# Patient Record
Sex: MALE | Race: WHITE | NOT HISPANIC OR LATINO | Employment: UNEMPLOYED | URBAN - METROPOLITAN AREA
[De-identification: names, ages, dates, MRNs, and addresses within clinical notes are randomized per-mention and may not be internally consistent; named-entity substitution may affect disease eponyms.]

---

## 2023-11-06 PROBLEM — H01.00B BLEPHARITIS OF UPPER AND LOWER EYELIDS OF BOTH EYES: Status: ACTIVE | Noted: 2023-04-03

## 2023-11-06 PROBLEM — H54.7 DECREASED VISION: Status: ACTIVE | Noted: 2021-01-14

## 2023-11-06 PROBLEM — I25.118 CORONARY ARTERY DISEASE OF NATIVE ARTERY WITH STABLE ANGINA PECTORIS (CMS-HCC): Status: ACTIVE | Noted: 2018-01-01

## 2023-11-06 PROBLEM — I20.89 STABLE ANGINA PECTORIS (CMS-HCC): Chronic | Status: ACTIVE | Noted: 2021-03-28

## 2023-11-06 PROBLEM — Z98.61 CAD S/P PERCUTANEOUS CORONARY ANGIOPLASTY: Status: ACTIVE | Noted: 2023-11-06

## 2023-11-06 PROBLEM — H01.00A BLEPHARITIS OF UPPER AND LOWER EYELIDS OF BOTH EYES: Status: ACTIVE | Noted: 2023-04-03

## 2023-11-06 PROBLEM — H16.223 KERATOCONJUNCTIVITIS SICCA OF BOTH EYES NOT SPECIFIED AS SJOGREN'S: Status: ACTIVE | Noted: 2023-04-03

## 2023-11-06 PROBLEM — I10 ESSENTIAL HYPERTENSION: Status: ACTIVE | Noted: 2021-01-14

## 2023-11-06 PROBLEM — Z95.5 STATUS POST CORONARY ARTERY BALLOON DILATION: Chronic | Status: ACTIVE | Noted: 2021-01-14

## 2023-11-06 PROBLEM — E78.5 HYPERLIPIDEMIA: Status: ACTIVE | Noted: 2023-11-06

## 2023-11-06 PROBLEM — H02.88A MEIBOMIAN GLAND DYSFUNCTION (MGD) OF UPPER AND LOWER LIDS OF BOTH EYES: Status: ACTIVE | Noted: 2021-03-04

## 2023-11-06 PROBLEM — I25.10 CAD S/P PERCUTANEOUS CORONARY ANGIOPLASTY: Status: ACTIVE | Noted: 2023-11-06

## 2023-11-06 PROBLEM — I20.0 WORSENING ANGINA (MULTI): Status: ACTIVE | Noted: 2023-11-06

## 2023-11-06 PROBLEM — E66.01 SEVERE OBESITY (BMI >= 40) (MULTI): Chronic | Status: ACTIVE | Noted: 2021-01-14

## 2023-11-06 PROBLEM — H02.88B MEIBOMIAN GLAND DYSFUNCTION (MGD) OF UPPER AND LOWER LIDS OF BOTH EYES: Status: ACTIVE | Noted: 2021-03-04

## 2023-11-06 PROBLEM — E78.00 PURE HYPERCHOLESTEROLEMIA: Status: ACTIVE | Noted: 2023-11-06

## 2023-11-06 PROBLEM — R07.89 NON-CARDIAC CHEST PAIN: Status: ACTIVE | Noted: 2023-11-06

## 2023-11-06 RX ORDER — VIT A/VIT C/VIT E/ZINC/COPPER 2148-113
1 TABLET ORAL
COMMUNITY
Start: 2023-08-05 | End: 2023-11-30 | Stop reason: ALTCHOICE

## 2023-11-06 RX ORDER — ROSUVASTATIN CALCIUM 40 MG/1
40 TABLET, COATED ORAL DAILY
COMMUNITY
Start: 2022-03-12 | End: 2023-11-30 | Stop reason: SDUPTHER

## 2023-11-06 RX ORDER — MULTIVITAMIN
1 TABLET ORAL
COMMUNITY
Start: 2022-03-31 | End: 2023-11-30 | Stop reason: ALTCHOICE

## 2023-11-06 RX ORDER — CLOPIDOGREL BISULFATE 75 MG/1
75 TABLET ORAL DAILY
COMMUNITY
Start: 2021-03-18 | End: 2023-11-30 | Stop reason: SDUPTHER

## 2023-11-06 RX ORDER — METFORMIN HYDROCHLORIDE 500 MG/1
500 TABLET ORAL
COMMUNITY
Start: 2022-03-31 | End: 2023-11-30 | Stop reason: ALTCHOICE

## 2023-11-06 RX ORDER — NITROGLYCERIN 0.3 MG/1
0.3 TABLET SUBLINGUAL EVERY 5 MIN PRN
COMMUNITY
Start: 2022-09-26 | End: 2023-11-30 | Stop reason: SDUPTHER

## 2023-11-06 RX ORDER — ASPIRIN 325 MG
50000 TABLET, DELAYED RELEASE (ENTERIC COATED) ORAL
COMMUNITY
Start: 2022-02-09

## 2023-11-06 RX ORDER — IVERMECTIN 3 MG/1
3 TABLET ORAL ONCE
COMMUNITY
Start: 2021-02-16 | End: 2024-05-30 | Stop reason: ALTCHOICE

## 2023-11-06 RX ORDER — OMEGA-3 FATTY ACIDS 1000 MG
1000 CAPSULE ORAL 3 TIMES DAILY
COMMUNITY
Start: 2022-03-31 | End: 2023-11-30 | Stop reason: ALTCHOICE

## 2023-11-06 RX ORDER — LOSARTAN POTASSIUM AND HYDROCHLOROTHIAZIDE 12.5; 5 MG/1; MG/1
1 TABLET ORAL
COMMUNITY
Start: 2021-03-19 | End: 2023-11-30 | Stop reason: SDUPTHER

## 2023-11-06 RX ORDER — ISOSORBIDE MONONITRATE 30 MG/1
30 TABLET, EXTENDED RELEASE ORAL DAILY
COMMUNITY
Start: 2021-04-19 | End: 2023-11-30 | Stop reason: SDUPTHER

## 2023-11-06 RX ORDER — BISOPROLOL FUMARATE 5 MG/1
5 TABLET, FILM COATED ORAL
COMMUNITY
Start: 2021-01-15 | End: 2023-11-30 | Stop reason: SDUPTHER

## 2023-11-06 RX ORDER — ATORVASTATIN CALCIUM 20 MG/1
20 TABLET, FILM COATED ORAL
COMMUNITY
Start: 2020-11-27 | End: 2023-11-30 | Stop reason: WASHOUT

## 2023-11-16 ENCOUNTER — APPOINTMENT (OUTPATIENT)
Dept: CARDIOLOGY | Facility: CLINIC | Age: 58
End: 2023-11-16
Payer: COMMERCIAL

## 2023-11-30 ENCOUNTER — OFFICE VISIT (OUTPATIENT)
Dept: CARDIOLOGY | Facility: CLINIC | Age: 58
End: 2023-11-30
Payer: COMMERCIAL

## 2023-11-30 VITALS
WEIGHT: 305.8 LBS | HEIGHT: 72 IN | BODY MASS INDEX: 41.42 KG/M2 | OXYGEN SATURATION: 99 % | HEART RATE: 72 BPM | SYSTOLIC BLOOD PRESSURE: 123 MMHG | DIASTOLIC BLOOD PRESSURE: 80 MMHG

## 2023-11-30 DIAGNOSIS — Z98.61 CAD S/P PERCUTANEOUS CORONARY ANGIOPLASTY: Primary | ICD-10-CM

## 2023-11-30 DIAGNOSIS — I25.2 HISTORY OF MI (MYOCARDIAL INFARCTION): ICD-10-CM

## 2023-11-30 DIAGNOSIS — E78.2 MIXED HYPERLIPIDEMIA: ICD-10-CM

## 2023-11-30 DIAGNOSIS — I20.89 STABLE ANGINA PECTORIS (CMS-HCC): Chronic | ICD-10-CM

## 2023-11-30 DIAGNOSIS — R53.83 FATIGUE, UNSPECIFIED TYPE: ICD-10-CM

## 2023-11-30 DIAGNOSIS — R94.31 ABNORMAL EKG: ICD-10-CM

## 2023-11-30 DIAGNOSIS — I25.118 CORONARY ARTERY DISEASE OF NATIVE ARTERY OF NATIVE HEART WITH STABLE ANGINA PECTORIS (CMS-HCC): ICD-10-CM

## 2023-11-30 DIAGNOSIS — I25.10 CAD S/P PERCUTANEOUS CORONARY ANGIOPLASTY: Primary | ICD-10-CM

## 2023-11-30 DIAGNOSIS — Z95.5 STATUS POST CORONARY ARTERY BALLOON DILATION: Chronic | ICD-10-CM

## 2023-11-30 DIAGNOSIS — E78.00 PURE HYPERCHOLESTEROLEMIA: ICD-10-CM

## 2023-11-30 DIAGNOSIS — I10 ESSENTIAL HYPERTENSION: ICD-10-CM

## 2023-11-30 DIAGNOSIS — R06.09 DOE (DYSPNEA ON EXERTION): ICD-10-CM

## 2023-11-30 DIAGNOSIS — K21.9 GASTROESOPHAGEAL REFLUX DISEASE WITHOUT ESOPHAGITIS: ICD-10-CM

## 2023-11-30 DIAGNOSIS — R07.89 NON-CARDIAC CHEST PAIN: ICD-10-CM

## 2023-11-30 DIAGNOSIS — E66.01 SEVERE OBESITY (BMI >= 40) (MULTI): Chronic | ICD-10-CM

## 2023-11-30 PROCEDURE — 1036F TOBACCO NON-USER: CPT | Performed by: INTERNAL MEDICINE

## 2023-11-30 PROCEDURE — 93000 ELECTROCARDIOGRAM COMPLETE: CPT | Performed by: INTERNAL MEDICINE

## 2023-11-30 PROCEDURE — 99215 OFFICE O/P EST HI 40 MIN: CPT | Performed by: INTERNAL MEDICINE

## 2023-11-30 PROCEDURE — 3074F SYST BP LT 130 MM HG: CPT | Performed by: INTERNAL MEDICINE

## 2023-11-30 PROCEDURE — 3079F DIAST BP 80-89 MM HG: CPT | Performed by: INTERNAL MEDICINE

## 2023-11-30 RX ORDER — NITROGLYCERIN 0.3 MG/1
0.3 TABLET SUBLINGUAL EVERY 5 MIN PRN
Qty: 25 TABLET | Refills: 11 | Status: SHIPPED | OUTPATIENT
Start: 2023-11-30 | End: 2023-12-12 | Stop reason: ALTCHOICE

## 2023-11-30 RX ORDER — CYCLOSPORINE 0.5 MG/ML
EMULSION OPHTHALMIC
COMMUNITY
Start: 2022-12-13

## 2023-11-30 RX ORDER — CLOPIDOGREL BISULFATE 75 MG/1
75 TABLET ORAL DAILY
Qty: 90 TABLET | Refills: 3 | Status: SHIPPED | OUTPATIENT
Start: 2023-11-30 | End: 2024-11-29

## 2023-11-30 RX ORDER — ASPIRIN 81 MG/1
81 TABLET ORAL
Qty: 90 TABLET | Refills: 3 | Status: SHIPPED | OUTPATIENT
Start: 2023-11-30 | End: 2024-11-29

## 2023-11-30 RX ORDER — ASPIRIN 81 MG/1
81 TABLET ORAL
COMMUNITY
Start: 2021-01-15 | End: 2023-11-30 | Stop reason: SDUPTHER

## 2023-11-30 RX ORDER — BISOPROLOL FUMARATE 5 MG/1
5 TABLET, FILM COATED ORAL
Qty: 90 TABLET | Refills: 3 | Status: SHIPPED | OUTPATIENT
Start: 2023-11-30 | End: 2024-11-29

## 2023-11-30 RX ORDER — CLINDAMYCIN HYDROCHLORIDE 300 MG/1
CAPSULE ORAL
COMMUNITY
Start: 2022-12-17 | End: 2023-11-30 | Stop reason: ALTCHOICE

## 2023-11-30 RX ORDER — FLASH GLUCOSE SENSOR
KIT MISCELLANEOUS
COMMUNITY
Start: 2023-11-16

## 2023-11-30 RX ORDER — ROSUVASTATIN CALCIUM 40 MG/1
40 TABLET, COATED ORAL DAILY
Qty: 30 TABLET | Refills: 3 | Status: SHIPPED | OUTPATIENT
Start: 2023-11-30 | End: 2024-11-29

## 2023-11-30 RX ORDER — LOSARTAN POTASSIUM AND HYDROCHLOROTHIAZIDE 12.5; 5 MG/1; MG/1
1 TABLET ORAL
Qty: 90 TABLET | Refills: 3 | Status: SHIPPED | OUTPATIENT
Start: 2023-11-30 | End: 2024-11-29

## 2023-11-30 RX ORDER — ISOSORBIDE MONONITRATE 60 MG/1
60 TABLET, EXTENDED RELEASE ORAL DAILY
Qty: 90 TABLET | Refills: 3 | Status: SHIPPED | OUTPATIENT
Start: 2023-11-30 | End: 2024-11-29

## 2023-11-30 RX ORDER — RANOLAZINE 500 MG/1
500 TABLET, EXTENDED RELEASE ORAL 2 TIMES DAILY
Qty: 180 TABLET | Refills: 3 | Status: SHIPPED | OUTPATIENT
Start: 2023-11-30 | End: 2024-11-29

## 2023-11-30 ASSESSMENT — PAIN SCALES - GENERAL: PAINLEVEL: 0-NO PAIN

## 2023-11-30 NOTE — PROGRESS NOTES
CARDIOLOGY OFFICE NOTE       Patient:    Clif Lorenzo    YOB: 1965  MRN:    [id number]    Date:   11/30/2023    Primary Physician: Dr YESENIA Donnelly     Reason for Visit: 1 year cardiology follow-up, prior patient of Dr. Crawford     .  IMPRESSION:      Chest pain  Shortness of breath with exertion  Fatigue  History of snoring  Coronary artery disease post prior inferior MI 7 cc area post LAD stent angioplasty 2008 ([place]), known subtotal right coronary artery, medical treatment to date otherwise.  Abnormal perfusion study 2022  Preserved left ventricular function, 55%.  Abnormal resting electrocardiogram  Hypertension  Hyperlipidemia  Diabetes  GERD  Past tobacco abuse  Obesity  Allergy to penicillin.  Otherwise as per assessment below.    RECOMMENDATIONS:      Patient has the above history and symptomatology.  He appears to be doing well overall with controlled symptoms given his known coronary artery disease.  Would suggest continuing his current medications with an increase of his Imdur to 60 mg daily and adding Ranexa 500 mg twice daily for better angina control.  Refills were otherwise provided.    Have encouraged him to follow an exercise dietary weight reduction program.  Hydration was encouraged.    He may need evaluation for sleep apnea in the future.    [employer] portal use was encouraged.    We will plan to see back in 6 months with Laboratory Studies and ECG as noted below.     Patient will follow up with their primary physician for general care.    The patient knows to contact medical care earlier if need be.      HPI:     Clif Lorenzo was seen in cardiac evaluation at the  Cardiology office November 30, 2023.      The patients problems are listed as in the impression above.    Electronic medical records reviewed.    Patient presents to establish ongoing cardiovascular care with myself.  This is our first visit.    Patient has a prior history of coronary artery disease with  inferior myocardial infarction in 2007 and this area ventral LAD stent angioplasty 2018 in [place].    Unsuccessful recanalization of the right coronary artery stenosis which was felt to be some totaled.  He had a positive perfusion stress test at the [facility] March 2021 with evidence of moderate inferior ischemia.  Normal myocardial function.  Ejection fraction was 55%.  Medical management was recommended by Dr. Coreas on his last visit.  No further testing was suggested.    Patient returns now for follow-up and states that overall he is about the same.  He works for [employer] as a .  He does get chest pain but is not limiting.  He does have dyspnea on exertion.  His wife states that he does snore at night.  He does admit to being tired.  He is carrying extra weight feels that this may be part of it.    He has no new complaint.    Patient denies Lightheadedness, Dizziness, TIA or CVA symptoms.  No CHF or Edema.  No Palpitations.  No GI,  or Bleeding Issues. No Recent Fever or Chills.     Cardiovascular and general review of systems is otherwise negative.    A 14-system review is otherwise negative, other than noted.    ALLERGIES:     Allergies   Allergen Reactions    Ampicillin-Sulbactam Anaphylaxis    Penicillins Unknown     Fainting    Aspirin Rash     When the dose is high        MEDICATIONS:     Current Outpatient Medications   Medication Instructions    aspirin 81 mg, oral, Daily RT    bisoprolol (ZEBETA) 5 mg, oral, Daily RT    cholecalciferol (VITAMIN D-3) 50,000 Units, oral, Weekly    clopidogrel (PLAVIX) 75 mg, oral, Daily    FreeStyle Christiano 2 Sensor kit USE TO CHECK BLOOD SUGAR DAILY.    isosorbide mononitrate ER (IMDUR) 60 mg, oral, Daily    ivermectin (STROMECTOL) 3 mg, oral, Once    losartan-hydrochlorothiazide (Hyzaar) 50-12.5 mg tablet 1 tablet, oral, Daily RT    nitroglycerin (NITROSTAT) 0.3 mg, sublingual, Every 5 min PRN    ranolazine (RANEXA) 500 mg, oral, 2 times daily, Do  not crush, chew, or split.    Restasis 0.05 % ophthalmic emulsion INSTILL ONE DROP IN BOTH EYES TWICE A DAY.    rosuvastatin (CRESTOR) 40 mg, oral, Daily       PAST MEDICAL HISTORY:   Hypertension.  Hyperlipidemia.  Diabetes.  No prior stroke, cerebrovascular disease or known peripheral vascular disease.  Coronary artery disease as noted above with prior inferior wall MI-2007, LAD stent angioplasty 2018, chronic right coronary artery stenosis medically treated today.  Abnormal perfusion stress test May 2021 with moderate ischemia.  Normal LV function.  Ejection fraction 55%.  Obesity.  No other significant past medical or surgical history.    SOCIAL HISTORY:   .  Works for [employer] as a .  Past smoker.  No alcohol, tobacco or illicit drug use currently.    FAMILY HISTORY:   Positive family history of coronary artery disease with father myocardial infarction.    ELECTROCARDIOGRAM:    Sinus rhythm, inferior wall microinfarction by Q's.  Poor R wave anterior progression cannot exclude anterior infarction.  Nonspecific ST wave changes.  Rate 68.    CARDIAC TESTING:    None recently done nor available for review.    LABORATORY DATA:    None recently done nor available for review.    VITALS:     Vitals:    11/30/23 1418   BP: 123/80   Pulse: 72   SpO2: 99%       Wt Readings from Last 4 Encounters:   11/30/23 139 kg (305 lb 12.8 oz)   11/15/22 (!) 140 kg (309 lb 0.2 oz)   11/09/21 (!) 139 kg (306 lb 0.2 oz)   04/19/21 134 kg (296 lb)       PHYSICAL EXAMINATION:      General: No acute distress. Vital signs as noted. Alert and oriented.  Head And Neck Examination: No jugular venous distention, no carotid bruits, no mass. Carotid upstrokes preserved. Oral mucosa moist.  No xanthelasma. Head and neck examination otherwise unremarkable.  Lungs: Clear to auscultation and percussion. No wheezes, no rales,  and no rhonchi.  Chest: Excursion appeared to be normal. No chest wall tenderness on palpation.  Heart:  Normal S1 and S2. No S3. No S4. No rub. Grade 1/6 systolic murmur, best heard at the left sternal border. Point of maximal impulse was within normal limits.  Abdomen: Soft. Nontender. No organomegaly. No bruits. No masses.  Morbidly obese.  Extremities: No bipedal edema. No clubbing. No cyanosis.  Pulses are strong throughout. No bruits.  Musculoskeletal Exam: No ulcers, otherwise unremarkable.  Neuro: Neurologically appeared grossly intact.    PROBLEM LIST:     Patient Active Problem List   Diagnosis    Blepharitis of upper and lower eyelids of both eyes    CAD S/P percutaneous coronary angioplasty    Coronary artery disease of native artery with stable angina pectoris (CMS/HCC)    Decreased vision    Essential hypertension    History of MI (myocardial infarction)    Hyperlipidemia    Keratoconjunctivitis sicca of both eyes not specified as Sjogren's    Meibomian gland dysfunction (MGD) of upper and lower lids of both eyes    Non-cardiac chest pain    Pure hypercholesterolemia    Severe obesity (BMI >= 40) (CMS/HCC)    Status post coronary artery balloon dilation    Stable angina pectoris    Worsening angina (CMS/[facility])             Kyrie Mcmanus MD, [facility] / [facility] /  Cardiology      Of Note:  [employer] voice recognition dictation software was utilized partially in the preparation of this note, therefore, inaccuracies in spelling, word choice and punctuation may have occurred which were not recognized the time of signing.    Patient was seen and examined with total time of visit including chart preparation, rooming, and chart completion exceeding 40 minutes.    ----

## 2023-11-30 NOTE — TELEPHONE ENCOUNTER
Pharmacist Gabbie Hewitt called office stating rx for Nitroglycerin 0.3 mg was escribed over. They do not have this medication dose in stock and state pt previously had rx for Nitroglycerin 0.4 mg tablets    Routed to Dr. Mcmanus for ok to send in Nitroglycerin 0.4 mg tablets.

## 2023-12-13 RX ORDER — NITROGLYCERIN 0.4 MG/1
0.4 TABLET SUBLINGUAL EVERY 5 MIN PRN
Qty: 100 TABLET | Refills: 11 | Status: SHIPPED | OUTPATIENT
Start: 2023-12-13 | End: 2024-12-12

## 2024-05-30 ENCOUNTER — OFFICE VISIT (OUTPATIENT)
Dept: CARDIOLOGY | Facility: CLINIC | Age: 59
End: 2024-05-30
Payer: COMMERCIAL

## 2024-05-30 VITALS
WEIGHT: 301.6 LBS | DIASTOLIC BLOOD PRESSURE: 73 MMHG | BODY MASS INDEX: 40.85 KG/M2 | SYSTOLIC BLOOD PRESSURE: 117 MMHG | HEART RATE: 61 BPM | HEIGHT: 72 IN

## 2024-05-30 DIAGNOSIS — Z95.5 STATUS POST CORONARY ARTERY BALLOON DILATION: Chronic | ICD-10-CM

## 2024-05-30 DIAGNOSIS — E66.01 SEVERE OBESITY (BMI >= 40) (MULTI): Chronic | ICD-10-CM

## 2024-05-30 DIAGNOSIS — I20.89 STABLE ANGINA PECTORIS (CMS-HCC): Chronic | ICD-10-CM

## 2024-05-30 DIAGNOSIS — Z98.61 CAD S/P PERCUTANEOUS CORONARY ANGIOPLASTY: ICD-10-CM

## 2024-05-30 DIAGNOSIS — I10 ESSENTIAL HYPERTENSION: ICD-10-CM

## 2024-05-30 DIAGNOSIS — I25.10 CAD S/P PERCUTANEOUS CORONARY ANGIOPLASTY: ICD-10-CM

## 2024-05-30 DIAGNOSIS — R94.31 ABNORMAL EKG: ICD-10-CM

## 2024-05-30 DIAGNOSIS — I25.118 CORONARY ARTERY DISEASE OF NATIVE ARTERY OF NATIVE HEART WITH STABLE ANGINA PECTORIS (CMS-HCC): ICD-10-CM

## 2024-05-30 DIAGNOSIS — K21.9 GASTROESOPHAGEAL REFLUX DISEASE WITHOUT ESOPHAGITIS: ICD-10-CM

## 2024-05-30 DIAGNOSIS — E78.2 MIXED HYPERLIPIDEMIA: Primary | ICD-10-CM

## 2024-05-30 DIAGNOSIS — R06.09 DOE (DYSPNEA ON EXERTION): ICD-10-CM

## 2024-05-30 DIAGNOSIS — R53.83 FATIGUE, UNSPECIFIED TYPE: ICD-10-CM

## 2024-05-30 DIAGNOSIS — I25.2 HISTORY OF MI (MYOCARDIAL INFARCTION): ICD-10-CM

## 2024-05-30 DIAGNOSIS — E78.00 PURE HYPERCHOLESTEROLEMIA: ICD-10-CM

## 2024-05-30 DIAGNOSIS — R07.89 NON-CARDIAC CHEST PAIN: ICD-10-CM

## 2024-05-30 DIAGNOSIS — E78.2 MIXED HYPERLIPIDEMIA: ICD-10-CM

## 2024-05-30 PROBLEM — Z86.39 HISTORY OF ELEVATED LIPIDS: Status: ACTIVE | Noted: 2024-05-30

## 2024-05-30 PROCEDURE — 3074F SYST BP LT 130 MM HG: CPT | Performed by: INTERNAL MEDICINE

## 2024-05-30 PROCEDURE — 3078F DIAST BP <80 MM HG: CPT | Performed by: INTERNAL MEDICINE

## 2024-05-30 PROCEDURE — 1036F TOBACCO NON-USER: CPT | Performed by: INTERNAL MEDICINE

## 2024-05-30 PROCEDURE — 99213 OFFICE O/P EST LOW 20 MIN: CPT | Performed by: INTERNAL MEDICINE

## 2024-05-30 RX ORDER — PREDNISOLONE ACETATE 10 MG/ML
1 SUSPENSION/ DROPS OPHTHALMIC 2 TIMES DAILY
COMMUNITY
Start: 2024-02-20

## 2024-05-30 RX ORDER — TOPIRAMATE 100 MG/1
100 TABLET, FILM COATED ORAL NIGHTLY
COMMUNITY
Start: 2024-05-21 | End: 2024-05-30 | Stop reason: ALTCHOICE

## 2024-05-30 RX ORDER — GLUCOSAM/CHONDRO/HERB 149/HYAL 750-100 MG
1000 TABLET ORAL 2 TIMES DAILY
COMMUNITY
Start: 2024-03-08

## 2024-05-30 ASSESSMENT — PAIN SCALES - GENERAL: PAINLEVEL: 0-NO PAIN

## 2024-05-30 NOTE — PROGRESS NOTES
CARDIOLOGY OFFICE NOTE     Date:   5/30/2024    Patient:    Clif Lorenzo    YOB: 1965    Primary Physician: Sole Donnelly DO       Reason for Visit: Follow-up cardiac visit    HPI:     Clif Lorenzo was seen in cardiac evaluation at the  Cardiology office May 30, 2024.      The patients problems are listed as in the impression below.    Electronic medical records reviewed.    Patient states that he is doing well.  He is not taking his medications consistently as you prefer not to take any medicines.  He has no complaints or new issues.    Patient denies Chest Pain, SOB, Lightheadedness, Dizziness, TIA or CVA symptoms.  No CHF or Edema.  No Palpitations.  No GI,  or Bleeding Issues. No Recent Fever or Chills.     Cardiovascular and general review of systems is otherwise negative.    A 14-system review is otherwise negative, other than noted.     PHYSICAL EXAMINATION:      Vitals:    05/30/24 1355   BP: 117/73   Pulse: 61     General: No acute distress. Vital signs as noted. Alert and oriented.  Head And Neck Examination: No jugular venous distention, no carotid bruits, no mass. Carotid upstrokes preserved. Oral mucosa moist.  No xanthelasma. Head and neck examination otherwise unremarkable.  Lungs: Clear to auscultation and percussion. No wheezes, no rales,  and no rhonchi.  Chest: Excursion appeared to be normal. No chest wall tenderness on palpation.  Heart: Normal S1 and S2. No S3. No S4. No rub. Grade 1/6 systolic murmur, best heard at the left sternal border. Point of maximal impulse was within normal limits.  Abdomen: Soft. Nontender. No organomegaly. No bruits. No masses.  Morbidly obese.  Extremities: No bipedal edema. No clubbing. No cyanosis.  Pulses are strong throughout. No bruits.  Musculoskeletal Exam: No ulcers, otherwise unremarkable.  Neuro: Neurologically appeared grossly intact.  .  IMPRESSION:      Cardiovascular status stable  Chest pain, resolved  Shortness of  breath with exertion, resolved  Fatigue  History of snoring  Coronary artery disease post prior inferior MI 7 cc area post LAD stent angioplasty 2008 ([place]), known subtotal right coronary artery, medical treatment to date otherwise.  Abnormal perfusion study 2022  Preserved left ventricular function, 55%.  Abnormal resting electrocardiogram  Hypertension  Hyperlipidemia  Diabetes  GERD  Past tobacco abuse  Obesity  Allergy to penicillin.  Otherwise as per assessment below.    RECOMMENDATIONS:      Patient overall is doing well.  Although he is not taking his medications he feels well.  Have encouraged him to continue his prior medications as prescribed.    Exercise dietary weight reduction program was encouraged.    Hydration.    [employer] portal use was encouraged.    We will plan to see back in 6 months with Laboratory Studies and ECG as ordered.     Patient will follow up with their primary physician for general care.    The patient knows to contact medical care earlier if need be.      ALLERGIES:     Allergies   Allergen Reactions    Ampicillin-Sulbactam Anaphylaxis    Penicillins Unknown     Fainting    Aspirin Rash     When the dose is high        MEDICATIONS:     Current Outpatient Medications   Medication Instructions    aspirin 81 mg, oral, Daily RT    bisoprolol (ZEBETA) 5 mg, oral, Daily RT    cholecalciferol (VITAMIN D-3) 50,000 Units, oral, Once Weekly    clopidogrel (PLAVIX) 75 mg, oral, Daily    FreeStyle Christiano 2 Sensor kit USE TO CHECK BLOOD SUGAR DAILY.    isosorbide mononitrate ER (IMDUR) 60 mg, oral, Daily    losartan-hydrochlorothiazide (Hyzaar) 50-12.5 mg tablet 1 tablet, oral, Daily RT    nitroglycerin (NITROSTAT) 0.4 mg, sublingual, Every 5 min PRN, May repeat dose every 5 minutes for up to 3 doses total.    omega 3-dha-epa-fish oil 1,000 mg (120 mg-180 mg) capsule 1,000 mg, oral, 2 times daily    prednisoLONE acetate (Pred-Forte) 1 % ophthalmic suspension 1 drop, Both Eyes, 2 times daily     ranolazine (RANEXA) 500 mg, oral, 2 times daily, Do not crush, chew, or split.    Restasis 0.05 % ophthalmic emulsion INSTILL ONE DROP IN BOTH EYES TWICE A DAY.    rosuvastatin (CRESTOR) 40 mg, oral, Daily       ELECTROCARDIOGRAM:      None this visit    CARDIAC TESTING:      None this visit    LABORATORY DATA:      He apparently had laboratory studies done within the last few weeks at his primary care physicians.  We are not have access to those records.                            PROBLEM LIST:     Patient Active Problem List   Diagnosis    Blepharitis of upper and lower eyelids of both eyes    CAD S/P percutaneous coronary angioplasty    Coronary artery disease of native artery with stable angina pectoris (CMS-HCC)    Decreased vision    Essential hypertension    History of MI (myocardial infarction)    Hyperlipidemia    Keratoconjunctivitis sicca of both eyes not specified as Sjogren's    Meibomian gland dysfunction (MGD) of upper and lower lids of both eyes    Non-cardiac chest pain    Pure hypercholesterolemia    Severe obesity (BMI >= 40) (Multi)    Status post coronary artery balloon dilation    Stable angina pectoris (CMS-HCC)    Worsening angina (Multi)    History of elevated lipids             Kyrie Mcmanus MD, [facility] / [facility] /  Cardiology      Of Note:  [employer] voice recognition dictation software was utilized partially in the preparation of this note, therefore, inaccuracies in spelling, word choice and punctuation may have occurred which were not recognized the time of signing.    Patient was seen and examined with total time of visit including chart preparation, rooming, and chart completion exceeding 40 minutes.      ----

## 2024-12-05 ENCOUNTER — APPOINTMENT (OUTPATIENT)
Dept: CARDIOLOGY | Facility: CLINIC | Age: 59
End: 2024-12-05
Payer: COMMERCIAL

## 2024-12-05 VITALS
SYSTOLIC BLOOD PRESSURE: 180 MMHG | OXYGEN SATURATION: 97 % | BODY MASS INDEX: 40.63 KG/M2 | HEIGHT: 72 IN | WEIGHT: 300 LBS | HEART RATE: 65 BPM | DIASTOLIC BLOOD PRESSURE: 80 MMHG

## 2024-12-05 DIAGNOSIS — I25.2 HISTORY OF MI (MYOCARDIAL INFARCTION): ICD-10-CM

## 2024-12-05 DIAGNOSIS — I20.89 STABLE ANGINA PECTORIS (CMS-HCC): Chronic | ICD-10-CM

## 2024-12-05 DIAGNOSIS — E66.01 SEVERE OBESITY (BMI >= 40) (MULTI): Chronic | ICD-10-CM

## 2024-12-05 DIAGNOSIS — R94.31 ABNORMAL EKG: ICD-10-CM

## 2024-12-05 DIAGNOSIS — R53.83 FATIGUE, UNSPECIFIED TYPE: ICD-10-CM

## 2024-12-05 DIAGNOSIS — I10 ESSENTIAL HYPERTENSION: ICD-10-CM

## 2024-12-05 DIAGNOSIS — I25.118 CORONARY ARTERY DISEASE OF NATIVE ARTERY OF NATIVE HEART WITH STABLE ANGINA PECTORIS: ICD-10-CM

## 2024-12-05 DIAGNOSIS — E78.2 MIXED HYPERLIPIDEMIA: ICD-10-CM

## 2024-12-05 DIAGNOSIS — E78.00 PURE HYPERCHOLESTEROLEMIA: ICD-10-CM

## 2024-12-05 DIAGNOSIS — K21.9 GASTROESOPHAGEAL REFLUX DISEASE WITHOUT ESOPHAGITIS: ICD-10-CM

## 2024-12-05 DIAGNOSIS — R06.09 DOE (DYSPNEA ON EXERTION): ICD-10-CM

## 2024-12-05 DIAGNOSIS — Z95.5 STATUS POST CORONARY ARTERY BALLOON DILATION: Chronic | ICD-10-CM

## 2024-12-05 DIAGNOSIS — R07.89 NON-CARDIAC CHEST PAIN: ICD-10-CM

## 2024-12-05 DIAGNOSIS — Z98.61 CAD S/P PERCUTANEOUS CORONARY ANGIOPLASTY: ICD-10-CM

## 2024-12-05 DIAGNOSIS — I25.10 CAD S/P PERCUTANEOUS CORONARY ANGIOPLASTY: ICD-10-CM

## 2024-12-05 PROCEDURE — 3079F DIAST BP 80-89 MM HG: CPT | Performed by: INTERNAL MEDICINE

## 2024-12-05 PROCEDURE — 3077F SYST BP >= 140 MM HG: CPT | Performed by: INTERNAL MEDICINE

## 2024-12-05 PROCEDURE — 3008F BODY MASS INDEX DOCD: CPT | Performed by: INTERNAL MEDICINE

## 2024-12-05 PROCEDURE — 99214 OFFICE O/P EST MOD 30 MIN: CPT | Performed by: INTERNAL MEDICINE

## 2024-12-05 PROCEDURE — 1036F TOBACCO NON-USER: CPT | Performed by: INTERNAL MEDICINE

## 2024-12-05 RX ORDER — LEVOTHYROXINE SODIUM 50 UG/1
TABLET ORAL
COMMUNITY
Start: 2024-12-03

## 2024-12-05 RX ORDER — TOPIRAMATE 25 MG/1
25 TABLET ORAL NIGHTLY
COMMUNITY
Start: 2024-11-27

## 2024-12-05 RX ORDER — RANOLAZINE 500 MG/1
500 TABLET, EXTENDED RELEASE ORAL 2 TIMES DAILY
Qty: 180 TABLET | Refills: 3 | Status: SHIPPED | OUTPATIENT
Start: 2024-12-05 | End: 2025-12-05

## 2024-12-05 RX ORDER — FENOFIBRATE 145 MG/1
145 TABLET, FILM COATED ORAL DAILY
Qty: 30 TABLET | Refills: 11 | Status: SHIPPED | OUTPATIENT
Start: 2024-12-05 | End: 2025-12-05

## 2024-12-05 NOTE — PROGRESS NOTES
CARDIOLOGY OFFICE NOTE     Date:   12/5/2024    Patient:    Clif Lorenzo    YOB: 1965    Primary Physician: Sole Donnelly DO       Reason for Visit: Cardiology follow-up       HPI:     Clif Lorenzo was seen in cardiac evaluation at the  Cardiology office December 5, 2024.      The patients problems are listed as in the impression below.    Electronic medical records reviewed.    Patient returns.  He feels well overall.  He is taking his medications.  He has no issues overall.    Patient denies Chest Pain, SOB, Lightheadedness, Dizziness, TIA or CVA symptoms.  No CHF or Edema.  No Palpitations.  No GI,  or Bleeding Issues. No Recent Fever or Chills.     Cardiovascular and general review of systems is otherwise negative.    A 14-system review is otherwise negative, other than noted.     PHYSICAL EXAMINATION:      Vitals:    12/05/24 1404   BP: 180/80   Pulse: 65   SpO2: 97%     General: No acute distress. Alert and oriented.  Head And Neck Examination: No jugular venous distention, no carotid bruits, no mass. Carotid upstrokes preserved. Oral mucosa moist.  No xanthelasma. Head and neck examination otherwise unremarkable.  Lungs: Clear to auscultation and percussion. No wheezes, no rales,  and no rhonchi.  Chest: Excursion appeared to be normal. No chest wall tenderness on palpation.  Heart: Normal S1 and S2. No S3. No S4. No rub. Grade 1/6 systolic murmur, best heard at the left sternal border. Point of maximal impulse was within normal limits.  Abdomen: Soft. Nontender. No organomegaly. No bruits. No masses.  Morbidly obese.  Extremities: No bipedal edema. No clubbing. No cyanosis.  Pulses are strong throughout. No bruits.  Musculoskeletal Exam: No ulcers, otherwise unremarkable.  Neuro: Neurologically appeared grossly intact.  .  IMPRESSION:       Cardiovascular status stable  Chest pain, resolved  Shortness of breath with exertion, resolved  Fatigue  History of snoring  Coronary  artery disease post prior inferior MI 7 cc area post LAD stent angioplasty 2008 ([place]), known subtotal right coronary artery, medical treatment to date otherwise.  Abnormal perfusion study 2022  Preserved left ventricular function, 55%.  Abnormal resting electrocardiogram  Hypertension  Hyperlipidemia  Diabetes  Hypothyroidism  Elevated PSA  GERD  Past tobacco abuse  Obesity  Allergy to penicillin.  Otherwise as per assessment below.    RECOMMENDATIONS:      Patient overall is doing well.  His laboratory studies were reviewed in detail with him.  His triglycerides are still elevated at 231 with ideal goal of less than 150.  We discussed avoiding flour, sugar starch, sugars.  Will add fenofibrate 135 mg daily.  He will continue his other medication.  Refills were provided.    Exercise dietary program.  Hydration.    [employer] portal use was encouraged.    We will plan to see back in 6 months with Laboratory Studies and ECG as ordered.     Patient will follow up with their primary physician for general care.    The patient knows to contact medical care earlier if need be.      ALLERGIES:     Allergies   Allergen Reactions    Ampicillin-Sulbactam Anaphylaxis    Penicillins Unknown     Fainting    Aspirin Rash     When the dose is high        MEDICATIONS:     Current Outpatient Medications   Medication Instructions    bisoprolol (ZEBETA) 5 mg, oral, Daily RT    cholecalciferol (VITAMIN D-3) 50,000 Units, Once Weekly    clopidogrel (PLAVIX) 75 mg, oral, Daily    fenofibrate (TRICOR) 145 mg, oral, Daily    FreeStyle Christiano 2 Sensor kit USE TO CHECK BLOOD SUGAR DAILY.    isosorbide mononitrate ER (IMDUR) 60 mg, oral, Daily    levothyroxine (Synthroid, Levoxyl) 50 mcg tablet     losartan-hydrochlorothiazide (Hyzaar) 50-12.5 mg tablet 1 tablet, oral, Daily    nitroglycerin (NITROSTAT) 0.4 mg, sublingual, Every 5 min PRN, May repeat dose every 5 minutes for up to 3 doses total.    omega 3-dha-epa-fish oil 1,000 mg (120 mg-180  mg) capsule 1,000 mg, 2 times daily    ranolazine (RANEXA) 500 mg, oral, 2 times daily, Do not crush, chew, or split.    Restasis 0.05 % ophthalmic emulsion INSTILL ONE DROP IN BOTH EYES TWICE A DAY.    rosuvastatin (CRESTOR) 40 mg, oral, Daily    topiramate (TOPAMAX) 25 mg, Nightly       ELECTROCARDIOGRAM:      None this visit    CARDIAC TESTING:      None this visit    LABORATORY DATA:      11/2024:  Chem-7, CBC, magnesium, vitamin D normal.  PSA elevated 4.5.  Hemoglobin A1c elevated 5.9.  TSH elevated at 3.  Cholesterol 150, triglyceride 231, HDL 34, LDL 84.              PROBLEM LIST:     Patient Active Problem List   Diagnosis    Blepharitis of upper and lower eyelids of both eyes    CAD S/P percutaneous coronary angioplasty    Coronary artery disease of native artery with stable angina pectoris (CMS-HCC)    Decreased vision    Essential hypertension    History of MI (myocardial infarction)    Hyperlipidemia    Keratoconjunctivitis sicca of both eyes not specified as Sjogren's    Meibomian gland dysfunction (MGD) of upper and lower lids of both eyes    Non-cardiac chest pain    Pure hypercholesterolemia    Severe obesity (BMI >= 40) (Multi)    Status post coronary artery balloon dilation    Stable angina pectoris (CMS-HCC)    Worsening angina (Multi)    History of elevated lipids             Kyrie Mcmanus MD, [facility]   [facility] /  Cardiology      Of Note:  [employer] voice recognition dictation software was utilized partially in the preparation of this note, therefore, inaccuracies in spelling, word choice and punctuation may have occurred which were not recognized the time of signing.    Patient was seen and examined with total time of visit including chart preparation, rooming, and chart completion exceeding 40 minutes.      ----

## 2025-06-05 ENCOUNTER — APPOINTMENT (OUTPATIENT)
Dept: CARDIOLOGY | Facility: CLINIC | Age: 60
End: 2025-06-05
Payer: COMMERCIAL

## 2025-07-09 ENCOUNTER — OFFICE VISIT (OUTPATIENT)
Dept: CARDIOLOGY | Facility: CLINIC | Age: 60
End: 2025-07-09
Payer: COMMERCIAL

## 2025-07-09 VITALS
DIASTOLIC BLOOD PRESSURE: 92 MMHG | OXYGEN SATURATION: 95 % | WEIGHT: 284 LBS | HEIGHT: 72 IN | BODY MASS INDEX: 38.47 KG/M2 | SYSTOLIC BLOOD PRESSURE: 157 MMHG | HEART RATE: 60 BPM

## 2025-07-09 DIAGNOSIS — I25.10 CAD S/P PERCUTANEOUS CORONARY ANGIOPLASTY: Primary | ICD-10-CM

## 2025-07-09 DIAGNOSIS — Z98.61 CAD S/P PERCUTANEOUS CORONARY ANGIOPLASTY: Primary | ICD-10-CM

## 2025-07-09 DIAGNOSIS — I25.118 CORONARY ARTERY DISEASE OF NATIVE ARTERY OF NATIVE HEART WITH STABLE ANGINA PECTORIS: ICD-10-CM

## 2025-07-09 PROCEDURE — 93005 ELECTROCARDIOGRAM TRACING: CPT | Performed by: INTERNAL MEDICINE

## 2025-07-09 PROCEDURE — 3077F SYST BP >= 140 MM HG: CPT | Performed by: INTERNAL MEDICINE

## 2025-07-09 PROCEDURE — 1036F TOBACCO NON-USER: CPT | Performed by: INTERNAL MEDICINE

## 2025-07-09 PROCEDURE — 3008F BODY MASS INDEX DOCD: CPT | Performed by: INTERNAL MEDICINE

## 2025-07-09 PROCEDURE — 99202 OFFICE O/P NEW SF 15 MIN: CPT | Mod: 25

## 2025-07-09 PROCEDURE — 93010 ELECTROCARDIOGRAM REPORT: CPT | Performed by: INTERNAL MEDICINE

## 2025-07-09 PROCEDURE — 3080F DIAST BP >= 90 MM HG: CPT | Performed by: INTERNAL MEDICINE

## 2025-07-09 PROCEDURE — 99214 OFFICE O/P EST MOD 30 MIN: CPT | Performed by: INTERNAL MEDICINE

## 2025-07-09 NOTE — PROGRESS NOTES
Name : Clif Lorenzo   : 1965   MRN : [id number]   ENC Date : 2025      Assessment and Plan:  Coronary artery disease: Status post remote PCI to LAD and known  of RCA.  We do not have any cardiac cath reports that this is from prior cardiology documentation.  This was confirmed with patient.  There is a nuclear imaging stress test from 2021 showing a mixed fixed and reversible defect in the inferior region which would be consistent with a  of the RCA.  EKG also shows inferior Q waves consistent with an inferior infarct.  LV function is normal.  Given lack of symptoms and the anatomy as described above I do not think repeat stress testing nor cardiac catheterization is needed.  Continue medical management.  Generally I do not favor long-term dual antiplatelet therapy but he is tolerating aspirin and Plavix well so we decided to not make any changes.  Continue rosuvastatin 40 mg daily.  Dyslipidemia: As above continue statin therapy.  Hypertension: Patient has not taken losartan in 2 days.  I asked him to resume that.  If blood pressure remains elevated there is plenty of room to increase losartan or consider switching to a different angiotensin receptor blocker.  Thyroid nodules: These were documented on ultrasound earlier this year.  It appears per the recommendation to repeat this in 1 year.  Thyroid biopsy was not recommended.  He has not seen endocrinology.  He is aware of the need for repeat ultrasound and follow-up of the nodules.  Hypothyroidism: TSH is slightly elevated with T4 and T3 are normal.  Continue routine follow-up with his primary care physician on levothyroxine.  Disp: RTO in 1 year or sooner if needed    HPI:  Patient is here to establish cardiac care.  It is a bit unclear exactly what his true cardiac history is as he was unsure of the exact dates.  As best I can tell from the documentation and speaking with him and his son it sounds as if he had a heart attack in .   At some point there was a documentation of an LAD stent perhaps being done in 2008 although the patient thinks it may have been 2018.  There is a nuclear imaging stress test from 2021 showing an inferior fixed defect with a reversible component as well with overall normal LVEF, 55%.  There are no echocardiograms.  No other heart catheterizations or stress test are available in Care Everywhere.  At some point he was treated with an antianginal regimen including Ranexa and isosorbide.  He stopped taking these as he was not proceeding any benefit.  He tells me that he is currently angina free.  He has been on dual antiplatelet therapy it sounds like for several years.  He has had no significant bleeding complications on dual antiplatelet therapy.  No TIA or CVA-like symptoms.  His blood pressure is elevated today but he states he has not taken his losartan 2 days.  No TIA or CVA-like symptoms.  No syncopal events.  No palpitations.    Problem list overview: Problem List[1]    Meds: Medications Ordered Prior to Encounter[2]     VS:  BP (!) 157/92 (BP Location: Right arm, Patient Position: Sitting, BP Cuff Size: Large adult)   Pulse 60   Ht 1.829 m (6')   Wt 129 kg (284 lb)   SpO2 95%   BMI 38.52 kg/m²     Vitals reviewed.   Neck:      Vascular: No JVD.   Pulmonary:      Breath sounds: Normal breath sounds.   Cardiovascular:      Normal rate. Regular rhythm.      Murmurs: There is no murmur.      No gallop.    Edema:     Peripheral edema absent.   Abdominal:      General: Abdomen is flat.      Palpations: Abdomen is soft.   Skin:     General: Skin is warm.       ECG: Normal sinus rhythm.  Mild first-degree AV block CT interval 212 ms, probable inferior MI old.    Adrien Wiseman MD       [1]   Patient Active Problem List  Diagnosis    Blepharitis of upper and lower eyelids of both eyes    CAD S/P percutaneous coronary angioplasty    Coronary artery disease of native artery with stable angina pectoris    Decreased  vision    Essential hypertension    History of MI (myocardial infarction)    Hyperlipidemia    Keratoconjunctivitis sicca of both eyes not specified as Sjogren's    Meibomian gland dysfunction (MGD) of upper and lower lids of both eyes    Non-cardiac chest pain    Pure hypercholesterolemia    Severe obesity (BMI >= 40) (Multi)    Status post coronary artery balloon dilation    Stable angina pectoris    Worsening angina    History of elevated lipids   [2]   Current Outpatient Medications on File Prior to Visit   Medication Sig Dispense Refill    aspirin 81 mg EC tablet TAKE ONE TABLET BY MOUTH EVERY DAY 90 tablet 0    bisoprolol (Zebeta) 5 mg tablet TAKE ONE TABLET BY MOUTH EVERY DAY 90 tablet 0    cholecalciferol (Vitamin D-3) 50,000 unit capsule Take 1 capsule (50,000 Units) by mouth 1 (one) time per week.      clopidogrel (Plavix) 75 mg tablet TAKE ONE TABLET BY MOUTH EVERY DAY 90 tablet 0    FreeStyle Christiano 2 Sensor kit USE TO CHECK BLOOD SUGAR DAILY.      levothyroxine (Synthroid, Levoxyl) 50 mcg tablet       losartan-hydrochlorothiazide (Hyzaar) 50-12.5 mg tablet TAKE ONE TABLET BY MOUTH EVERY DAY 90 tablet 0    nitroglycerin (Nitrostat) 0.4 mg SL tablet Place 1 tablet (0.4 mg) under the tongue every 5 minutes if needed for chest pain. May repeat dose every 5 minutes for up to 3 doses total. 100 tablet 11    rosuvastatin (Crestor) 40 mg tablet TAKE ONE TABLET BY MOUTH ONCE DAILY 30 tablet 0    Restasis 0.05 % ophthalmic emulsion INSTILL ONE DROP IN BOTH EYES TWICE A DAY. (Patient not taking: Reported on 7/9/2025)      [DISCONTINUED] fenofibrate (Tricor) 145 mg tablet Take 1 tablet (145 mg) by mouth once daily. (Patient not taking: Reported on 7/9/2025) 30 tablet 11    [DISCONTINUED] isosorbide mononitrate ER (Imdur) 60 mg 24 hr tablet Take 1 tablet (60 mg) by mouth once daily. (Patient not taking: Reported on 7/9/2025) 90 tablet 3    [DISCONTINUED] omega 3-dha-epa-fish oil 1,000 mg (120 mg-180 mg) capsule Take  1 capsule (1,000 mg) by mouth 2 times a day. (Patient not taking: Reported on 7/9/2025)      [DISCONTINUED] ranolazine (Ranexa) 500 mg 12 hr tablet Take 1 tablet (500 mg) by mouth 2 times a day. Do not crush, chew, or split. (Patient not taking: Reported on 7/9/2025) 180 tablet 3    [DISCONTINUED] rosuvastatin (Crestor) 40 mg tablet TAKE ONE TABLET BY MOUTH EVERY DAY 30 tablet 0    [DISCONTINUED] rosuvastatin (Crestor) 40 mg tablet TAKE ONE TABLET BY MOUTH EVERY DAY (Patient not taking: Reported on 7/9/2025) 30 tablet 0    [DISCONTINUED] topiramate (Topamax) 25 mg tablet Take 1 tablet (25 mg) by mouth once daily at bedtime. (Patient not taking: Reported on 7/9/2025)       No current facility-administered medications on file prior to visit.

## 2025-07-11 LAB
ATRIAL RATE: 61 BPM
P AXIS: 33 DEGREES
P OFFSET: 169 MS
P ONSET: 108 MS
PR INTERVAL: 212 MS
Q ONSET: 214 MS
QRS COUNT: 10 BEATS
QRS DURATION: 108 MS
QT INTERVAL: 432 MS
QTC CALCULATION(BAZETT): 434 MS
QTC FREDERICIA: 434 MS
R AXIS: 14 DEGREES
T AXIS: 44 DEGREES
T OFFSET: 430 MS
VENTRICULAR RATE: 61 BPM

## 2025-07-28 DIAGNOSIS — I25.118 CORONARY ARTERY DISEASE OF NATIVE ARTERY OF NATIVE HEART WITH STABLE ANGINA PECTORIS: ICD-10-CM

## 2025-07-28 DIAGNOSIS — I10 ESSENTIAL HYPERTENSION: ICD-10-CM

## 2025-07-28 DIAGNOSIS — R06.09 DOE (DYSPNEA ON EXERTION): ICD-10-CM

## 2025-07-28 DIAGNOSIS — K21.9 GASTROESOPHAGEAL REFLUX DISEASE WITHOUT ESOPHAGITIS: ICD-10-CM

## 2025-07-28 DIAGNOSIS — Z98.61 CAD S/P PERCUTANEOUS CORONARY ANGIOPLASTY: ICD-10-CM

## 2025-07-28 DIAGNOSIS — I25.2 HISTORY OF MI (MYOCARDIAL INFARCTION): ICD-10-CM

## 2025-07-28 DIAGNOSIS — R07.89 NON-CARDIAC CHEST PAIN: ICD-10-CM

## 2025-07-28 DIAGNOSIS — E78.00 PURE HYPERCHOLESTEROLEMIA: ICD-10-CM

## 2025-07-28 DIAGNOSIS — I20.89 STABLE ANGINA PECTORIS: Chronic | ICD-10-CM

## 2025-07-28 DIAGNOSIS — R94.31 ABNORMAL EKG: ICD-10-CM

## 2025-07-28 DIAGNOSIS — I25.10 CAD S/P PERCUTANEOUS CORONARY ANGIOPLASTY: ICD-10-CM

## 2025-07-28 DIAGNOSIS — Z95.5 STATUS POST CORONARY ARTERY BALLOON DILATION: Chronic | ICD-10-CM

## 2025-07-28 DIAGNOSIS — E66.01 SEVERE OBESITY (BMI >= 40) (MULTI): Chronic | ICD-10-CM

## 2025-07-28 DIAGNOSIS — R53.83 FATIGUE, UNSPECIFIED TYPE: ICD-10-CM

## 2025-07-28 DIAGNOSIS — E78.2 MIXED HYPERLIPIDEMIA: ICD-10-CM

## 2025-07-28 RX ORDER — ROSUVASTATIN CALCIUM 40 MG/1
40 TABLET, COATED ORAL DAILY
Qty: 90 TABLET | Refills: 3 | Status: SHIPPED | OUTPATIENT
Start: 2025-07-28

## 2025-07-28 RX ORDER — BISOPROLOL FUMARATE 5 MG/1
5 TABLET, FILM COATED ORAL DAILY
Qty: 90 TABLET | Refills: 0 | Status: SHIPPED | OUTPATIENT
Start: 2025-07-28

## 2025-07-28 RX ORDER — ASPIRIN 81 MG/1
81 TABLET ORAL DAILY
Qty: 90 TABLET | Refills: 0 | Status: SHIPPED | OUTPATIENT
Start: 2025-07-28

## 2025-07-28 RX ORDER — CLOPIDOGREL BISULFATE 75 MG/1
75 TABLET ORAL DAILY
Qty: 90 TABLET | Refills: 0 | Status: SHIPPED | OUTPATIENT
Start: 2025-07-28

## 2025-07-29 DIAGNOSIS — I10 ESSENTIAL HYPERTENSION: ICD-10-CM

## 2025-07-29 DIAGNOSIS — R53.83 FATIGUE, UNSPECIFIED TYPE: ICD-10-CM

## 2025-07-29 DIAGNOSIS — Z98.61 CAD S/P PERCUTANEOUS CORONARY ANGIOPLASTY: ICD-10-CM

## 2025-07-29 DIAGNOSIS — R94.31 ABNORMAL EKG: ICD-10-CM

## 2025-07-29 DIAGNOSIS — K21.9 GASTROESOPHAGEAL REFLUX DISEASE WITHOUT ESOPHAGITIS: ICD-10-CM

## 2025-07-29 DIAGNOSIS — Z95.5 STATUS POST CORONARY ARTERY BALLOON DILATION: Chronic | ICD-10-CM

## 2025-07-29 DIAGNOSIS — E78.00 PURE HYPERCHOLESTEROLEMIA: ICD-10-CM

## 2025-07-29 DIAGNOSIS — I25.10 CAD S/P PERCUTANEOUS CORONARY ANGIOPLASTY: ICD-10-CM

## 2025-07-29 DIAGNOSIS — R07.89 NON-CARDIAC CHEST PAIN: ICD-10-CM

## 2025-07-29 DIAGNOSIS — E66.01 SEVERE OBESITY (BMI >= 40) (MULTI): Chronic | ICD-10-CM

## 2025-07-29 DIAGNOSIS — I20.89 STABLE ANGINA PECTORIS: Chronic | ICD-10-CM

## 2025-07-29 DIAGNOSIS — R06.09 DOE (DYSPNEA ON EXERTION): ICD-10-CM

## 2025-07-29 DIAGNOSIS — I25.2 HISTORY OF MI (MYOCARDIAL INFARCTION): ICD-10-CM

## 2025-07-29 DIAGNOSIS — E78.2 MIXED HYPERLIPIDEMIA: ICD-10-CM

## 2025-07-29 DIAGNOSIS — I25.118 CORONARY ARTERY DISEASE OF NATIVE ARTERY OF NATIVE HEART WITH STABLE ANGINA PECTORIS: ICD-10-CM

## 2025-07-30 RX ORDER — LOSARTAN POTASSIUM AND HYDROCHLOROTHIAZIDE 12.5; 5 MG/1; MG/1
1 TABLET ORAL DAILY
Qty: 90 TABLET | Refills: 3 | Status: SHIPPED | OUTPATIENT
Start: 2025-07-30

## 2025-09-04 DIAGNOSIS — E78.2 MIXED HYPERLIPIDEMIA: ICD-10-CM

## 2025-09-04 DIAGNOSIS — Z98.61 CAD S/P PERCUTANEOUS CORONARY ANGIOPLASTY: ICD-10-CM

## 2025-09-04 DIAGNOSIS — R53.83 FATIGUE, UNSPECIFIED TYPE: ICD-10-CM

## 2025-09-04 DIAGNOSIS — I25.2 HISTORY OF MI (MYOCARDIAL INFARCTION): ICD-10-CM

## 2025-09-04 DIAGNOSIS — E66.01 SEVERE OBESITY (BMI >= 40) (MULTI): Chronic | ICD-10-CM

## 2025-09-04 DIAGNOSIS — Z95.5 STATUS POST CORONARY ARTERY BALLOON DILATION: Chronic | ICD-10-CM

## 2025-09-04 DIAGNOSIS — I20.89 STABLE ANGINA PECTORIS: Chronic | ICD-10-CM

## 2025-09-04 DIAGNOSIS — R06.09 DOE (DYSPNEA ON EXERTION): ICD-10-CM

## 2025-09-04 DIAGNOSIS — I25.118 CORONARY ARTERY DISEASE OF NATIVE ARTERY OF NATIVE HEART WITH STABLE ANGINA PECTORIS: ICD-10-CM

## 2025-09-04 DIAGNOSIS — E78.00 PURE HYPERCHOLESTEROLEMIA: ICD-10-CM

## 2025-09-04 DIAGNOSIS — I25.10 CAD S/P PERCUTANEOUS CORONARY ANGIOPLASTY: ICD-10-CM

## 2025-09-04 DIAGNOSIS — R94.31 ABNORMAL EKG: ICD-10-CM

## 2025-09-04 DIAGNOSIS — I10 ESSENTIAL HYPERTENSION: ICD-10-CM

## 2025-09-04 DIAGNOSIS — K21.9 GASTROESOPHAGEAL REFLUX DISEASE WITHOUT ESOPHAGITIS: ICD-10-CM

## 2025-09-04 DIAGNOSIS — R07.89 NON-CARDIAC CHEST PAIN: ICD-10-CM

## 2025-09-04 RX ORDER — LOSARTAN POTASSIUM AND HYDROCHLOROTHIAZIDE 12.5; 5 MG/1; MG/1
1 TABLET ORAL DAILY
Qty: 90 TABLET | Refills: 3 | Status: SHIPPED | OUTPATIENT
Start: 2025-09-04

## 2025-09-04 RX ORDER — CLOPIDOGREL BISULFATE 75 MG/1
75 TABLET ORAL DAILY
Qty: 90 TABLET | Refills: 3 | Status: SHIPPED | OUTPATIENT
Start: 2025-09-04

## 2025-09-04 RX ORDER — BISOPROLOL FUMARATE 5 MG/1
5 TABLET, FILM COATED ORAL DAILY
Qty: 90 TABLET | Refills: 3 | Status: SHIPPED | OUTPATIENT
Start: 2025-09-04

## 2025-09-04 RX ORDER — ASPIRIN 81 MG/1
81 TABLET ORAL DAILY
Qty: 90 TABLET | Refills: 3 | Status: SHIPPED | OUTPATIENT
Start: 2025-09-04

## 2025-09-04 RX ORDER — NITROGLYCERIN 0.4 MG/1
0.4 TABLET SUBLINGUAL EVERY 5 MIN PRN
Qty: 25 TABLET | Refills: 3 | Status: SHIPPED | OUTPATIENT
Start: 2025-09-04 | End: 2026-09-04

## 2025-09-04 RX ORDER — ROSUVASTATIN CALCIUM 40 MG/1
40 TABLET, COATED ORAL DAILY
Qty: 90 TABLET | Refills: 3 | Status: SHIPPED | OUTPATIENT
Start: 2025-09-04

## 2026-07-10 ENCOUNTER — APPOINTMENT (OUTPATIENT)
Dept: CARDIOLOGY | Facility: CLINIC | Age: 61
End: 2026-07-10
Payer: COMMERCIAL